# Patient Record
Sex: MALE | Race: BLACK OR AFRICAN AMERICAN | NOT HISPANIC OR LATINO | Employment: OTHER | URBAN - METROPOLITAN AREA
[De-identification: names, ages, dates, MRNs, and addresses within clinical notes are randomized per-mention and may not be internally consistent; named-entity substitution may affect disease eponyms.]

---

## 2017-08-25 PROBLEM — Z72.0 TOBACCO ABUSE: Status: ACTIVE | Noted: 2017-08-25

## 2017-08-25 PROBLEM — J44.1 COPD WITH EXACERBATION: Status: ACTIVE | Noted: 2017-08-25

## 2017-08-25 PROBLEM — F19.10 SUBSTANCE ABUSE: Status: ACTIVE | Noted: 2017-08-25

## 2017-08-31 PROBLEM — J44.9 COPD (CHRONIC OBSTRUCTIVE PULMONARY DISEASE): Status: ACTIVE | Noted: 2017-08-31

## 2017-08-31 PROBLEM — Z09 HOSPITAL DISCHARGE FOLLOW-UP: Status: ACTIVE | Noted: 2017-08-31

## 2017-12-04 PROBLEM — Z09 HOSPITAL DISCHARGE FOLLOW-UP: Status: RESOLVED | Noted: 2017-08-31 | Resolved: 2017-12-04

## 2018-02-12 PROBLEM — J44.1 COPD EXACERBATION: Status: ACTIVE | Noted: 2018-02-12

## 2018-02-15 PROBLEM — I10 ESSENTIAL HYPERTENSION: Status: ACTIVE | Noted: 2018-02-15

## 2018-06-13 PROBLEM — R07.9 CHEST PAIN: Status: ACTIVE | Noted: 2018-06-13

## 2018-06-13 PROBLEM — R07.9 CHEST PAIN AT REST: Status: ACTIVE | Noted: 2018-06-13

## 2018-06-28 PROBLEM — R25.1 TREMOR: Status: ACTIVE | Noted: 2018-06-28

## 2018-06-28 PROBLEM — G89.29 CHRONIC PAIN OF RIGHT KNEE: Status: ACTIVE | Noted: 2018-06-28

## 2018-06-28 PROBLEM — M25.561 CHRONIC PAIN OF RIGHT KNEE: Status: ACTIVE | Noted: 2018-06-28

## 2018-06-28 PROBLEM — Z00.00 HEALTHCARE MAINTENANCE: Status: ACTIVE | Noted: 2018-06-28

## 2018-08-29 PROBLEM — J44.1 COPD EXACERBATION: Status: RESOLVED | Noted: 2018-02-12 | Resolved: 2018-08-29

## 2018-10-01 PROBLEM — Z00.00 HEALTHCARE MAINTENANCE: Status: RESOLVED | Noted: 2018-06-28 | Resolved: 2018-10-01

## 2019-01-17 PROBLEM — N40.0 BPH (BENIGN PROSTATIC HYPERPLASIA): Status: ACTIVE | Noted: 2019-01-17

## 2019-02-18 ENCOUNTER — CLINICAL SUPPORT (OUTPATIENT)
Dept: SMOKING CESSATION | Facility: CLINIC | Age: 59
End: 2019-02-18
Payer: COMMERCIAL

## 2019-02-18 DIAGNOSIS — F17.210 MODERATE SMOKER (20 OR LESS PER DAY): Primary | ICD-10-CM

## 2019-02-18 PROCEDURE — 99404 PR PREVENT COUNSEL,INDIV,60 MIN: ICD-10-PCS | Mod: S$GLB,,,

## 2019-02-18 PROCEDURE — 99404 PREV MED CNSL INDIV APPRX 60: CPT | Mod: S$GLB,,,

## 2019-02-18 RX ORDER — IBUPROFEN 200 MG
1 TABLET ORAL DAILY
Qty: 14 PATCH | Refills: 0 | Status: SHIPPED | OUTPATIENT
Start: 2019-02-18 | End: 2019-04-11 | Stop reason: SDUPTHER

## 2019-02-18 NOTE — Clinical Note
Patient currently smoking a pack per day and will begin weekly tobacco cessation group meetings. Patient will begin prescribed tobacco cessation medication regimen of 21mg nicotine patch daily as directed.

## 2019-03-06 ENCOUNTER — TELEPHONE (OUTPATIENT)
Dept: SMOKING CESSATION | Facility: CLINIC | Age: 59
End: 2019-03-06

## 2019-04-11 DIAGNOSIS — F17.210 MODERATE SMOKER (20 OR LESS PER DAY): ICD-10-CM

## 2019-04-15 RX ORDER — IBUPROFEN 200 MG
TABLET ORAL
Qty: 14 PATCH | Refills: 0 | Status: SHIPPED | OUTPATIENT
Start: 2019-04-15 | End: 2019-04-17 | Stop reason: SDUPTHER

## 2019-04-17 ENCOUNTER — CLINICAL SUPPORT (OUTPATIENT)
Dept: SMOKING CESSATION | Facility: CLINIC | Age: 59
End: 2019-04-17
Payer: COMMERCIAL

## 2019-04-17 DIAGNOSIS — F17.210 MODERATE SMOKER (20 OR LESS PER DAY): ICD-10-CM

## 2019-04-17 DIAGNOSIS — F17.200 NICOTINE DEPENDENCE: Primary | ICD-10-CM

## 2019-04-17 PROCEDURE — 90853 PR GROUP PSYCHOTHERAPY: ICD-10-PCS | Mod: S$GLB,,,

## 2019-04-17 PROCEDURE — 90853 GROUP PSYCHOTHERAPY: CPT | Mod: S$GLB,,,

## 2019-04-17 RX ORDER — IBUPROFEN 200 MG
1 TABLET ORAL DAILY
Qty: 14 PATCH | Refills: 0 | Status: ON HOLD | OUTPATIENT
Start: 2019-04-17 | End: 2019-05-02 | Stop reason: HOSPADM

## 2019-04-17 NOTE — Clinical Note
0ppm; 0 cig/day since 4/14/19; Patient remains on prescribed tobacco cessation medication regimen of 21 mg patch without any negative side effects at this time; desire to quit = 10, confidence = 10

## 2019-04-17 NOTE — PROGRESS NOTES
Site: [facility]  Date:  4/17/2019  Clinical Status of Patient: Outpatient   Length of Service and Code: 90 minutes - 13072   Number in Attendance: 9  Group Activities/Focus of Group:  orientation, client introductions, completion of TCRS (Tobacco Cessation Rating Scale) learned addiction model, cues/triggers, personal reasons for quitting, medications, goals, quit date    Target symptoms:  withdrawal and medication side effects             The following were rated moderate (3) to severe (4) on TCRS:       Moderate 3:skin burning/itching; reviewed with patient     Severe 4:   none  Patient's Response to Intervention: 0ppm; 0 cig/day since 4/14/19; Patient remains on prescribed tobacco cessation medication regimen of 21 mg patch without any negative side effects at this time; desire to quit = 10, confidence = 10      Progress Toward Goals and Other Mental Status Changes: The patient denies any abnormal behavioral or mental changes at this time.     Diagnosis: F17.210    Plan: The patient will continue with group therapy sessions and medication monitoring by CTTS. Prescribed medication management will be by physician.   Return to Clinic: 1 week

## 2019-04-24 ENCOUNTER — CLINICAL SUPPORT (OUTPATIENT)
Dept: SMOKING CESSATION | Facility: CLINIC | Age: 59
End: 2019-04-24
Payer: COMMERCIAL

## 2019-04-24 DIAGNOSIS — F17.200 NICOTINE DEPENDENCE: Primary | ICD-10-CM

## 2019-04-24 PROCEDURE — 90853 PR GROUP PSYCHOTHERAPY: ICD-10-PCS | Mod: S$GLB,,,

## 2019-04-24 PROCEDURE — 90853 GROUP PSYCHOTHERAPY: CPT | Mod: S$GLB,,,

## 2019-04-24 NOTE — Clinical Note
0ppm; 3 cig total in past week; Patient remains on prescribed tobacco cessation medication regimen of 21 mg patch without any negative side effects at this time; desire to quit = 10, confidence = 7

## 2019-04-24 NOTE — PROGRESS NOTES
Smoking Cessation Group Session #2    Site: [facility]  Date:  4/24/2019  Clinical Status of Patient: Outpatient   Length of Service and Code: 90 minutes - 50674   Number in Attendance: 7  Group Activities/Focus of Group:  completion of TCRS (Tobacco Cessation Rating Scale) reviewed strategies, cues, and triggers. Introduced the negative impact of tobacco on health, the health advantages of discontinuing the use of tobacco, time line improved health changes after a quit, withdrawal issues to expect from nicotine and habit, and ways to achieve the goal of a quit.    Target symptoms:  withdrawal and medication side effects             The following were rated moderate (3) to severe (4) on TCRS:       Moderate 3: jaw muscle aches, heart racing, unusual/vivid dreams; reviewed with patient     Severe 4:   Insomnia; reviewed with patient  Patient's Response to Intervention: 0ppm; 3 cig total in past week; Patient remains on prescribed tobacco cessation medication regimen of 21 mg patch without any negative side effects at this time; desire to quit = 10, confidence = 7      Progress Toward Goals and Other Mental Status Changes: The patient denies any abnormal behavioral or mental changes at this time.     Diagnosis: F17.210    Plan: The patient will continue with group therapy sessions and medication monitoring by CTTS. Prescribed medication management will be by physician.   Return to Clinic: 1 week    Quit Date:    Planned Quit Date:

## 2019-04-28 PROBLEM — J44.1 COPD WITH ACUTE EXACERBATION: Status: ACTIVE | Noted: 2019-04-28

## 2019-05-01 ENCOUNTER — TELEPHONE (OUTPATIENT)
Dept: SMOKING CESSATION | Facility: CLINIC | Age: 59
End: 2019-05-01

## 2019-05-01 NOTE — TELEPHONE ENCOUNTER
Attempted to contact pt to discuss missed group appt; person stated he was currently admitted to hospital

## 2019-05-08 ENCOUNTER — TELEPHONE (OUTPATIENT)
Dept: SMOKING CESSATION | Facility: CLINIC | Age: 59
End: 2019-05-08

## 2019-05-15 PROBLEM — J44.1 COPD EXACERBATION: Status: RESOLVED | Noted: 2018-02-12 | Resolved: 2019-05-15

## 2019-05-15 PROBLEM — R07.9 CHEST PAIN: Status: RESOLVED | Noted: 2018-06-13 | Resolved: 2019-05-15

## 2019-05-15 PROBLEM — R07.9 CHEST PAIN AT REST: Status: RESOLVED | Noted: 2018-06-13 | Resolved: 2019-05-15

## 2019-07-24 PROBLEM — R07.89 CHEST PRESSURE: Status: ACTIVE | Noted: 2019-07-24

## 2019-07-24 PROBLEM — Z86.19 HISTORY OF HEPATITIS C: Status: ACTIVE | Noted: 2019-07-24

## 2019-07-24 PROBLEM — Z12.11 SCREENING FOR COLON CANCER: Status: ACTIVE | Noted: 2019-07-24

## 2019-07-25 PROBLEM — Z87.09 HISTORY OF COPD: Status: ACTIVE | Noted: 2019-04-28

## 2019-07-25 PROBLEM — Z87.898 HISTORY OF CHEST PAIN: Status: ACTIVE | Noted: 2019-07-24

## 2019-08-13 ENCOUNTER — TELEPHONE (OUTPATIENT)
Dept: SMOKING CESSATION | Facility: CLINIC | Age: 59
End: 2019-08-13

## 2019-08-14 ENCOUNTER — CLINICAL SUPPORT (OUTPATIENT)
Dept: SMOKING CESSATION | Facility: CLINIC | Age: 59
End: 2019-08-14
Payer: COMMERCIAL

## 2019-08-14 DIAGNOSIS — F17.200 NICOTINE DEPENDENCE: Primary | ICD-10-CM

## 2019-08-14 PROCEDURE — 99407 PR TOBACCO USE CESSATION INTENSIVE >10 MINUTES: ICD-10-PCS | Mod: S$GLB,,, | Performed by: INTERNAL MEDICINE

## 2019-08-14 PROCEDURE — 99407 BEHAV CHNG SMOKING > 10 MIN: CPT | Mod: S$GLB,,, | Performed by: INTERNAL MEDICINE

## 2019-08-14 NOTE — PROGRESS NOTES
Spoke with patient today in regard to smoking cessation progress for 3/6 month telephone follow up, he states not tobacco free. Patient has scheduled an appointment to return to the program for quit attempt #2 on 9/3/2019 @ 10:00 am. Informed patient of benefit period, future follow ups, and contact information if any further help or support is needed. Will resolve episode and complete smart form for Quit attempt #1.

## 2019-09-12 ENCOUNTER — TELEPHONE (OUTPATIENT)
Dept: SMOKING CESSATION | Facility: CLINIC | Age: 59
End: 2019-09-12

## 2019-09-24 ENCOUNTER — TELEPHONE (OUTPATIENT)
Dept: SMOKING CESSATION | Facility: CLINIC | Age: 59
End: 2019-09-24

## 2019-10-20 PROBLEM — R45.851 SUICIDAL IDEATION: Status: ACTIVE | Noted: 2019-10-20

## 2019-10-20 PROBLEM — F33.2 SEVERE RECURRENT MAJOR DEPRESSION WITHOUT PSYCHOTIC FEATURES: Status: ACTIVE | Noted: 2019-10-20

## 2020-01-30 PROBLEM — J96.02 ACUTE RESPIRATORY FAILURE WITH HYPERCAPNIA: Status: ACTIVE | Noted: 2020-01-30

## 2020-01-30 PROBLEM — J96.00 ACUTE RESPIRATORY FAILURE: Status: ACTIVE | Noted: 2020-01-30

## 2020-01-30 PROBLEM — I46.9 CARDIOPULMONARY ARREST: Status: ACTIVE | Noted: 2020-01-30

## 2020-01-30 PROBLEM — E87.20 LACTIC ACIDOSIS: Status: ACTIVE | Noted: 2020-01-30

## 2020-01-30 PROBLEM — R41.89 UNRESPONSIVE: Status: ACTIVE | Noted: 2020-01-30

## 2020-01-31 PROBLEM — E63.9 INADEQUATE DIETARY ENERGY INTAKE: Status: ACTIVE | Noted: 2020-01-31

## 2020-02-04 PROBLEM — E63.9 INADEQUATE DIETARY ENERGY INTAKE: Status: RESOLVED | Noted: 2020-01-31 | Resolved: 2020-02-04

## 2020-04-26 PROBLEM — I16.0 HYPERTENSIVE URGENCY: Status: ACTIVE | Noted: 2020-04-26

## 2020-04-26 PROBLEM — F32.A DEPRESSION: Status: ACTIVE | Noted: 2020-04-26

## 2020-04-27 ENCOUNTER — TELEPHONE (OUTPATIENT)
Dept: ADMINISTRATIVE | Facility: HOSPITAL | Age: 60
End: 2020-04-27

## 2020-05-04 PROBLEM — J44.1 COPD EXACERBATION: Status: RESOLVED | Noted: 2018-02-12 | Resolved: 2020-05-04

## 2023-04-21 NOTE — PROGRESS NOTES
Patient currently smoking a pack per day and will begin weekly tobacco cessation group meetings. Patient will begin prescribed tobacco cessation medication regimen of 21mg nicotine patch daily as directed.   
[FreeTextEntry3] : This note was written by Sherry Sánchez on 04/21/2023 acting solely as a scribe for Dr. Rebel Clement.\par  \par All medical record entries made by the Scribe were at my, Dr. Rebel Clement, direction and personally dictated by me on 04/21/2023. I have personally reviewed the chart and agree that the record accurately reflects my personal performance of the history, physical exam, assessment and plan.